# Patient Record
Sex: FEMALE | Race: BLACK OR AFRICAN AMERICAN | Employment: FULL TIME | URBAN - METROPOLITAN AREA
[De-identification: names, ages, dates, MRNs, and addresses within clinical notes are randomized per-mention and may not be internally consistent; named-entity substitution may affect disease eponyms.]

---

## 2019-12-08 ENCOUNTER — HOSPITAL ENCOUNTER (EMERGENCY)
Age: 59
Discharge: HOME | End: 2019-12-08
Attending: EMERGENCY MEDICINE
Payer: COMMERCIAL

## 2019-12-08 ENCOUNTER — APPOINTMENT (OUTPATIENT)
Dept: GENERAL RADIOLOGY | Age: 59
End: 2019-12-08
Attending: EMERGENCY MEDICINE
Payer: COMMERCIAL

## 2019-12-08 VITALS
WEIGHT: 200 LBS | DIASTOLIC BLOOD PRESSURE: 87 MMHG | TEMPERATURE: 98.4 F | HEART RATE: 63 BPM | OXYGEN SATURATION: 100 % | SYSTOLIC BLOOD PRESSURE: 179 MMHG | HEIGHT: 62 IN | RESPIRATION RATE: 20 BRPM | BODY MASS INDEX: 36.8 KG/M2

## 2019-12-08 DIAGNOSIS — M54.32 SCIATICA OF LEFT SIDE: Primary | ICD-10-CM

## 2019-12-08 PROCEDURE — 99282 EMERGENCY DEPT VISIT SF MDM: CPT

## 2019-12-08 PROCEDURE — 73502 X-RAY EXAM HIP UNI 2-3 VIEWS: CPT

## 2019-12-08 RX ORDER — ASPIRIN 81 MG/1
81 TABLET ORAL DAILY
COMMUNITY

## 2019-12-08 RX ORDER — HYDROCHLOROTHIAZIDE 12.5 MG/1
25 TABLET ORAL DAILY
COMMUNITY
End: 2021-10-06 | Stop reason: SDUPTHER

## 2019-12-08 RX ORDER — METHOCARBAMOL 500 MG/1
500 TABLET, FILM COATED ORAL 4 TIMES DAILY
Qty: 16 TAB | Refills: 0 | Status: SHIPPED | OUTPATIENT
Start: 2019-12-08 | End: 2021-07-19

## 2019-12-08 RX ORDER — PREDNISONE 10 MG/1
TABLET ORAL
Qty: 21 TAB | Refills: 0 | Status: SHIPPED | OUTPATIENT
Start: 2019-12-08 | End: 2021-07-19 | Stop reason: ALTCHOICE

## 2019-12-08 RX ORDER — ATORVASTATIN CALCIUM 80 MG/1
80 TABLET, FILM COATED ORAL DAILY
COMMUNITY

## 2019-12-08 RX ORDER — LOSARTAN POTASSIUM 100 MG/1
100 TABLET ORAL DAILY
COMMUNITY

## 2019-12-08 NOTE — LETTER
NOTIFICATION OF RETURN TO WORK / SCHOOL 
 
12/8/2019 Ms. Jaylyn Huang [address] [facility] ([place], [place]) [address] To Whom It May Concern: Jaylyn Huang was seen in the ED on 12/8/19 and may be excused from work for 3 days. Sincerely, Brittney Hamilton PA-C

## 2019-12-08 NOTE — ED TRIAGE NOTES
Pt c/o left hip for \"weeks\". Getting worse. Better when sitting still. Hx of same years ago but it went away. Just moved here from [place].  States it didn't hurt while she was there

## 2019-12-09 NOTE — ED PROVIDER NOTES
EMERGENCY DEPARTMENT HISTORY AND PHYSICAL EXAM    Date: 12/8/2019  Patient Name: Wan Dozier    History of Presenting Illness     Chief Complaint   Patient presents with    Hip Pain         History Provided By:patient     Chief Complaint:hip pain   Duration: several weeks  Timing: acute  Location: L hip and buttocks  Quality: sharp pain  Severity:moderate  Modifying Factors: tylenol has not helped  Associated Symptoms: none       Additional History (Context): Wan Dozier is a 61 y.o. female with PMH GERD, htn, and colon cancer who presents with c/o atraumatic L hip/buttocks pain worse with movement and not alleviated by tylenol x several weeks. Pt denies urinary sx and back pain. No other complaints reported at this time. PCP: Antonia Johnson MD    Current Outpatient Medications   Medication Sig Dispense Refill    atorvastatin (LIPITOR) 40 mg tablet Take 40 mg by mouth daily.  losartan (COZAAR) 50 mg tablet Take 50 mg by mouth daily.  hydroCHLOROthiazide (HYDRODIURIL) 25 mg tablet Take 25 mg by mouth daily.  aspirin delayed-release 81 mg tablet Take 81 mg by mouth daily.  predniSONE (STERAPRED DS) 10 mg dose pack Use as directed 21 Tab 0    methocarbamol (ROBAXIN) 500 mg tablet Take 1 Tab by mouth four (4) times daily. 16 Tab 0    losartan-hydrochlorothiazide (HYZAAR) 50-12.5 mg per tablet Take 1 tablet by mouth daily. Past History     Past Medical History:  Past Medical History:   Diagnosis Date    Cancer [facility])     colon    GERD (gastroesophageal reflux disease)     History of echocardiogram 03/30/2004; 9/2014    EF 60-65%. No WMA.   Normal echo; EF - 50%    Hypertension     Ill-defined condition     high cholesterol    Mixed hyperlipidemia     Osteoarthritis of lumbar spine     Venous (peripheral) insufficiency        Past Surgical History:  Past Surgical History:   Procedure Laterality Date    HX COLONOSCOPY  10/2014    sigmoidoscopy    HX COLONOSCOPY colonoscopy    HX GI      kory anterior resection of colon    HX GYN      hysterectomy    HX HEENT      tonsillectomy       Family History:  Family History   Problem Relation Age of Onset    Hypertension Mother     Lupus Sister     Cancer Maternal Grandmother         ovarian       Social History:  Social History     Tobacco Use    Smoking status: Never Smoker    Smokeless tobacco: Never Used   Substance Use Topics    Alcohol use: Yes     Comment: 3 glass a week     Drug use: No       Allergies: Allergies   Allergen Reactions    Sulfa (Sulfonamide Antibiotics) Itching         Review of Systems   Review of Systems   Constitutional: Negative. Negative for chills and fever. HENT: Negative. Negative for congestion, ear pain and rhinorrhea. Eyes: Negative. Negative for pain and redness. Respiratory: Negative. Negative for cough, shortness of breath, wheezing and stridor. Cardiovascular: Negative. Negative for chest pain and leg swelling. Gastrointestinal: Negative. Negative for abdominal pain, constipation, diarrhea, nausea and vomiting. Genitourinary: Negative. Negative for dysuria and frequency. Musculoskeletal: Positive for arthralgias. Negative for back pain and neck pain. Skin: Negative. Negative for rash and wound. Neurological: Negative. Negative for dizziness, seizures, syncope and headaches. All other systems reviewed and are negative. All Other Systems Negative  Physical Exam     Vitals:    12/08/19 1818   BP: 179/87   Pulse: 63   Resp: 20   Temp: 98.4 °F (36.9 °C)   SpO2: 100%   Weight: 90.7 kg (200 lb)   Height: 5' 2\" (1.575 m)     Physical Exam  Vitals signs and nursing note reviewed. Constitutional:       General: She is not in acute distress. Appearance: She is well-developed. She is obese. She is not diaphoretic. HENT:      Head: Normocephalic and atraumatic. Eyes:      General: No scleral icterus. Right eye: No discharge.          Left eye: No discharge. Conjunctiva/sclera: Conjunctivae normal.   Neck:      Musculoskeletal: Normal range of motion and neck supple. Cardiovascular:      Rate and Rhythm: Normal rate and regular rhythm. Heart sounds: Normal heart sounds. No murmur. No friction rub. No gallop. Pulmonary:      Effort: Pulmonary effort is normal. No respiratory distress. Breath sounds: Normal breath sounds. No stridor. No wheezing or rales. Musculoskeletal: Normal range of motion. Comments: No midline tenderness to palpation noted to the spine. Mild tenderness to palpation and hypertonicity noted to the left iliac crest and left gluteal vianney region. Increased pain into the left lower extremity noted on deep palpation of this area. Range of motion of the spine is intact. Skin:     General: Skin is warm and dry. Findings: No erythema or rash. Neurological:      Mental Status: She is alert and oriented to person, place, and time. Coordination: Coordination normal.      Comments: Gait is steady and patient exhibits no evidence of ataxia. Patient is able to ambulate without difficulty. No focal neurological deficit noted. No facial droop, slurred speech, or evidence of altered mentation noted on exam.      Psychiatric:         Behavior: Behavior normal.         Thought Content: Thought content normal.                Diagnostic Study Results     Labs -   No results found for this or any previous visit (from the past 12 hour(s)). Radiologic Studies -   XR HIP LT W OR WO PELV 2-3 VWS    (Results Pending)   no acute process   CT Results  (Last 48 hours)    None        CXR Results  (Last 48 hours)    None            Medical Decision Making   I am the first provider for this patient. I reviewed the vital signs, available nursing notes, past medical history, past surgical history, family history and social history. Vital Signs-Reviewed the patient's vital signs.           Records Reviewed: April J Bladimir Morelos PA-C   Procedures:  Procedures    Provider Notes (Medical Decision Making): Impression:  Sciatica    X-rays negative for acute process, clinical presentation suggestive of sciatica. Will plan to d/c with robaxin and prednisone with pcp follow-up. Pt agrees with this plan. Brittney Hamilton PA-C     MED RECONCILIATION:  No current facility-administered medications for this encounter. Current Outpatient Medications   Medication Sig    atorvastatin (LIPITOR) 40 mg tablet Take 40 mg by mouth daily.  losartan (COZAAR) 50 mg tablet Take 50 mg by mouth daily.  hydroCHLOROthiazide (HYDRODIURIL) 25 mg tablet Take 25 mg by mouth daily.  aspirin delayed-release 81 mg tablet Take 81 mg by mouth daily.  predniSONE (STERAPRED DS) 10 mg dose pack Use as directed    methocarbamol (ROBAXIN) 500 mg tablet Take 1 Tab by mouth four (4) times daily.  losartan-hydrochlorothiazide (HYZAAR) 50-12.5 mg per tablet Take 1 tablet by mouth daily. Disposition:  D/c    DISCHARGE NOTE:   Patient is stable for discharge at this time. I have discussed all the findings from today's work up with the patient, including lab results and imaging. I have answered all questions. Rx for robaxin and ultram given. Rest and close follow-up with the PCP recommended this week. Return to the ED immediately for any new or worsening symptoms.   Brittney Hamilton PA-C     Follow-up Information     Follow up With Specialties Details Why Contact Info    Berny Umaña MD Internal Medicine Schedule an appointment as soon as possible for a visit in 1 week  [address]  [address]  [phone] [address] [facility] EMERGENCY DEPT Emergency Medicine  As needed, If symptoms worsen [address] [facility]  [phone]          Current Discharge Medication List      START taking these medications    Details   predniSONE (STERAPRED DS) 10 mg dose pack Use as directed  Qty: 21 Tab, Refills: 0 methocarbamol (ROBAXIN) 500 mg tablet Take 1 Tab by mouth four (4) times daily. Qty: 16 Tab, Refills: 0                 Diagnosis     Clinical Impression:   1.  Sciatica of left side

## 2019-12-09 NOTE — DISCHARGE INSTRUCTIONS
[email or web address] Activation    Thank you for requesting access to [email or web address]. Please follow the instructions below to securely access and download your online medical record. [email or web address] allows you to send messages to your doctor, view your test results, renew your prescriptions, schedule appointments, and more. How Do I Sign Up? 1. In your internet browser, go to [email or web address]  2. Click on the First Time User? Click Here link in the Sign In box. You will be redirect to the New Member Sign Up page. 3. Enter your cloud.IQ Access Code exactly as it appears below. You will not need to use this code after youve completed the sign-up process. If you do not sign up before the expiration date, you must request a new code. [email or web address][employer]IQ Access Code: [email or web address]  Expires: 1/10/2020  9:19 PM (This is the date your cloud.IQ access code will )    4. Enter the last four digits of your Social Security Number (xxxx) and Date of Birth (mm/dd/yyyy) as indicated and click Submit. You will be taken to the next sign-up page. 5. Create a cloud.IQ ID. This will be your cloud.IQ login ID and cannot be changed, so think of one that is secure and easy to remember. 6. Create a cloud[employer]IQ password. You can change your password at any time. 7. Enter your Password Reset Question and Answer. This can be used at a later time if you forget your password. 8. Enter your e-mail address. You will receive e-mail notification when new information is available in [address] 9. Click Sign Up. You can now view and download portions of your medical record. 10. Click the Download Summary menu link to download a portable copy of your medical information. Additional Information    If you have questions, please visit the Frequently Asked Questions section of the [email or web address] website at [email or web address] Remember, [email or web address] is NOT to be used for urgent needs. For medical emergencies, dial 911.      Complete all medications as prescribed. Follow-up with primary care doctor in 1 week. Return to the ED immediately for any new or worsening symptoms.

## 2020-01-07 ENCOUNTER — HOSPITAL ENCOUNTER (OUTPATIENT)
Dept: MAMMOGRAPHY | Age: 60
Discharge: HOME | End: 2020-01-07
Attending: FAMILY MEDICINE
Payer: COMMERCIAL

## 2020-01-07 ENCOUNTER — HOSPITAL ENCOUNTER (OUTPATIENT)
Dept: BONE DENSITY | Age: 60
Discharge: HOME | End: 2020-01-07
Attending: FAMILY MEDICINE
Payer: COMMERCIAL

## 2020-01-07 DIAGNOSIS — Z78.0 ASYMPTOMATIC MENOPAUSAL STATE: ICD-10-CM

## 2020-01-07 DIAGNOSIS — Z13.820 SCREENING FOR OSTEOPOROSIS: ICD-10-CM

## 2020-01-07 DIAGNOSIS — Z12.31 VISIT FOR SCREENING MAMMOGRAM: ICD-10-CM

## 2020-01-07 PROCEDURE — 77080 DXA BONE DENSITY AXIAL: CPT

## 2020-01-07 PROCEDURE — 77063 BREAST TOMOSYNTHESIS BI: CPT

## 2020-10-12 ENCOUNTER — TRANSCRIBE ORDER (OUTPATIENT)
Dept: SCHEDULING | Age: 60
End: 2020-10-12

## 2020-10-12 DIAGNOSIS — Z12.31 VISIT FOR SCREENING MAMMOGRAM: Primary | ICD-10-CM

## 2020-10-13 ENCOUNTER — TRANSCRIBE ORDER (OUTPATIENT)
Dept: SCHEDULING | Age: 60
End: 2020-10-13

## 2020-10-13 DIAGNOSIS — Z12.31 VISIT FOR SCREENING MAMMOGRAM: Primary | ICD-10-CM

## 2021-03-29 ENCOUNTER — HOSPITAL ENCOUNTER (EMERGENCY)
Age: 61
Discharge: HOME | End: 2021-03-30
Attending: EMERGENCY MEDICINE
Payer: COMMERCIAL

## 2021-03-29 ENCOUNTER — APPOINTMENT (OUTPATIENT)
Dept: CT IMAGING | Age: 61
End: 2021-03-29
Attending: EMERGENCY MEDICINE
Payer: COMMERCIAL

## 2021-03-29 ENCOUNTER — APPOINTMENT (OUTPATIENT)
Dept: GENERAL RADIOLOGY | Age: 61
End: 2021-03-29
Attending: EMERGENCY MEDICINE
Payer: COMMERCIAL

## 2021-03-29 VITALS
WEIGHT: 190 LBS | OXYGEN SATURATION: 99 % | DIASTOLIC BLOOD PRESSURE: 54 MMHG | TEMPERATURE: 98.1 F | BODY MASS INDEX: 34.96 KG/M2 | RESPIRATION RATE: 28 BRPM | HEART RATE: 70 BPM | HEIGHT: 62 IN | SYSTOLIC BLOOD PRESSURE: 118 MMHG

## 2021-03-29 DIAGNOSIS — R00.2 PALPITATIONS: Primary | ICD-10-CM

## 2021-03-29 DIAGNOSIS — R10.13 EPIGASTRIC BURNING SENSATION: ICD-10-CM

## 2021-03-29 LAB
ANION GAP SERPL CALC-SCNC: 9 MMOL/L (ref 3–18)
ATRIAL RATE: 97 BPM
BASOPHILS # BLD: 0 K/UL (ref 0–0.1)
BASOPHILS NFR BLD: 0 % (ref 0–2)
BNP SERPL-MCNC: 190 PG/ML (ref 0–900)
BUN SERPL-MCNC: 15 MG/DL (ref 7–18)
BUN/CREAT SERPL: 24 (ref 12–20)
CALCIUM SERPL-MCNC: 9.4 MG/DL (ref 8.5–10.1)
CALCULATED P AXIS, ECG09: 55 DEGREES
CALCULATED R AXIS, ECG10: -8 DEGREES
CALCULATED T AXIS, ECG11: -10 DEGREES
CHLORIDE SERPL-SCNC: 104 MMOL/L (ref 100–111)
CK MB CFR SERPL CALC: NORMAL % (ref 0–4)
CK MB CFR SERPL CALC: NORMAL % (ref 0–4)
CK MB SERPL-MCNC: <1 NG/ML (ref 5–25)
CK MB SERPL-MCNC: <1 NG/ML (ref 5–25)
CK SERPL-CCNC: 131 U/L (ref 26–192)
CK SERPL-CCNC: 85 U/L (ref 26–192)
CO2 SERPL-SCNC: 27 MMOL/L (ref 21–32)
CREAT SERPL-MCNC: 0.62 MG/DL (ref 0.6–1.3)
DIAGNOSIS, 93000: NORMAL
DIFFERENTIAL METHOD BLD: ABNORMAL
EOSINOPHIL # BLD: 0.1 K/UL (ref 0–0.4)
EOSINOPHIL NFR BLD: 2 % (ref 0–5)
ERYTHROCYTE [DISTWIDTH] IN BLOOD BY AUTOMATED COUNT: 13.9 % (ref 11.6–14.5)
GLUCOSE SERPL-MCNC: 128 MG/DL (ref 74–99)
HCT VFR BLD AUTO: 35.8 % (ref 35–45)
HGB BLD-MCNC: 12.2 G/DL (ref 12–16)
LYMPHOCYTES # BLD: 3.5 K/UL (ref 0.9–3.6)
LYMPHOCYTES NFR BLD: 43 % (ref 21–52)
MCH RBC QN AUTO: 29.2 PG (ref 24–34)
MCHC RBC AUTO-ENTMCNC: 34.1 G/DL (ref 31–37)
MCV RBC AUTO: 85.6 FL (ref 74–97)
MONOCYTES # BLD: 0.4 K/UL (ref 0.05–1.2)
MONOCYTES NFR BLD: 5 % (ref 3–10)
NEUTS SEG # BLD: 4.2 K/UL (ref 1.8–8)
NEUTS SEG NFR BLD: 50 % (ref 40–73)
P-R INTERVAL, ECG05: 168 MS
PLATELET # BLD AUTO: 388 K/UL (ref 135–420)
PMV BLD AUTO: 9.2 FL (ref 9.2–11.8)
POTASSIUM SERPL-SCNC: 3.3 MMOL/L (ref 3.5–5.5)
Q-T INTERVAL, ECG07: 398 MS
QRS DURATION, ECG06: 102 MS
QTC CALCULATION (BEZET), ECG08: 505 MS
RBC # BLD AUTO: 4.18 M/UL (ref 4.2–5.3)
SODIUM SERPL-SCNC: 140 MMOL/L (ref 136–145)
TROPONIN I SERPL-MCNC: <0.02 NG/ML (ref 0–0.04)
TROPONIN I SERPL-MCNC: <0.02 NG/ML (ref 0–0.04)
VENTRICULAR RATE, ECG03: 97 BPM
WBC # BLD AUTO: 8.2 K/UL (ref 4.6–13.2)

## 2021-03-29 PROCEDURE — 74011250636 HC RX REV CODE- 250/636: Performed by: EMERGENCY MEDICINE

## 2021-03-29 PROCEDURE — 83880 ASSAY OF NATRIURETIC PEPTIDE: CPT

## 2021-03-29 PROCEDURE — 74011000250 HC RX REV CODE- 250: Performed by: EMERGENCY MEDICINE

## 2021-03-29 PROCEDURE — 71275 CT ANGIOGRAPHY CHEST: CPT

## 2021-03-29 PROCEDURE — 85025 COMPLETE CBC W/AUTO DIFF WBC: CPT

## 2021-03-29 PROCEDURE — 82553 CREATINE MB FRACTION: CPT

## 2021-03-29 PROCEDURE — C9113 INJ PANTOPRAZOLE SODIUM, VIA: HCPCS | Performed by: EMERGENCY MEDICINE

## 2021-03-29 PROCEDURE — 74011250637 HC RX REV CODE- 250/637: Performed by: EMERGENCY MEDICINE

## 2021-03-29 PROCEDURE — 71045 X-RAY EXAM CHEST 1 VIEW: CPT

## 2021-03-29 PROCEDURE — 74011000636 HC RX REV CODE- 636: Performed by: EMERGENCY MEDICINE

## 2021-03-29 PROCEDURE — 80048 BASIC METABOLIC PNL TOTAL CA: CPT

## 2021-03-29 PROCEDURE — 93005 ELECTROCARDIOGRAM TRACING: CPT

## 2021-03-29 PROCEDURE — 99285 EMERGENCY DEPT VISIT HI MDM: CPT

## 2021-03-29 PROCEDURE — 96374 THER/PROPH/DIAG INJ IV PUSH: CPT

## 2021-03-29 RX ORDER — POTASSIUM CHLORIDE 20 MEQ/1
40 TABLET, EXTENDED RELEASE ORAL
Status: COMPLETED | OUTPATIENT
Start: 2021-03-29 | End: 2021-03-29

## 2021-03-29 RX ADMIN — PANTOPRAZOLE SODIUM 40 MG: 40 INJECTION, POWDER, FOR SOLUTION INTRAVENOUS at 21:23

## 2021-03-29 RX ADMIN — POTASSIUM CHLORIDE 40 MEQ: 1500 TABLET, EXTENDED RELEASE ORAL at 21:05

## 2021-03-29 RX ADMIN — IOPAMIDOL 100 ML: 755 INJECTION, SOLUTION INTRAVENOUS at 18:33

## 2021-03-29 NOTE — ED PROVIDER NOTES
EMERGENCY DEPARTMENT HISTORY AND PHYSICAL EXAM  This was created with voice recognition software and transcription errors may be present. 6:03 PM  Date: 3/29/2021  Patient Name: Amalia Chamberlain    History of Presenting Illness     Chief Complaint:    History Provided By:     HPI: Amalia Chamberlain is a 61 y.o. female medical history of colon cancer reflux hypertension high cholesterol hyperlipidemia who presents with belching and palpitations. Patient states that she has been having some increased belching and took an over-the-counter antacid for it. She notes that it is only occurring after she eats it did occur this morning but she had not eaten anything she was doing working at home. She notes that she kept having to stand up and walk outside because she was feeling palpitations like she might be short of breath. Denies any abdominal pain or chest pain nausea or vomiting. No diaphoresis. Patient is feeling better now    PCP: Orquidea Raphael DO      Past History     Past Medical History:  Past Medical History:   Diagnosis Date    Cancer [facility])     colon    GERD (gastroesophageal reflux disease)     History of echocardiogram 03/30/2004; 9/2014    EF 60-65%. No WMA.   Normal echo; EF - 50%    Hypertension     Ill-defined condition     high cholesterol    Mixed hyperlipidemia     Osteoarthritis of lumbar spine     Venous (peripheral) insufficiency        Past Surgical History:  Past Surgical History:   Procedure Laterality Date    HX COLONOSCOPY  10/2014    sigmoidoscopy    HX COLONOSCOPY      colonoscopy    HX GI      kory anterior resection of colon    HX GYN      hysterectomy    HX HEENT      tonsillectomy       Family History:  Family History   Problem Relation Age of Onset    Hypertension Mother     Lupus Sister     Cancer Maternal Grandmother         ovarian       Social History:  Social History     Tobacco Use    Smoking status: Never Smoker    Smokeless tobacco: Never Used Substance Use Topics    Alcohol use: Yes     Comment: 3 glass a week     Drug use: No       Allergies: Allergies   Allergen Reactions    Sulfa (Sulfonamide Antibiotics) Itching       Review of Systems     Review of Systems   All other systems reviewed and are negative. 10 point review of systems otherwise negative unless noted in HPI. Physical Exam       Physical Exam  Constitutional:       Appearance: She is well-developed. HENT:      Head: Normocephalic and atraumatic. Eyes:      Pupils: Pupils are equal, round, and reactive to light. Neck:      Musculoskeletal: Normal range of motion and neck supple. Cardiovascular:      Rate and Rhythm: Normal rate and regular rhythm. Heart sounds: Normal heart sounds. No murmur. No friction rub. Pulmonary:      Effort: Pulmonary effort is normal. No respiratory distress. Breath sounds: Normal breath sounds. No wheezing. Abdominal:      General: There is no distension. Palpations: Abdomen is soft. Tenderness: There is no abdominal tenderness. There is no guarding or rebound. Musculoskeletal: Normal range of motion. Skin:     General: Skin is warm and dry. Neurological:      Mental Status: She is alert and oriented to person, place, and time. Psychiatric:         Behavior: Behavior normal.         Thought Content: Thought content normal.         Diagnostic Study Results     Vital Signs   Visit Vitals  BP (!) 145/86   Pulse 82   Temp 98.1 °F (36.7 °C)   Resp 22   Ht 5' 2\" (1.575 m)   Wt 86.2 kg (190 lb)   SpO2 100%   BMI 34.75 kg/m²      EKG: EKG shows sinus at 97 normal axis normal intervals there is no ST elevation or depression lvh  Labs: CBC and chemistry unremarkable mild low potassium BNP normal  Imaging: IMPRESSION     No evidence for pulmonary embolism.     Some minimal subsegmental atelectasis in the lung bases. Lungs are otherwise  clear.     Medical Decision Making     ED Course: Progress Notes, Reevaluation, and Consults:      Provider Notes (Medical Decision Making): 71-year-old female presents with belching some palpitations lightheadedness now feeling better will check basic labs troponin EKG and reassess    Mild hypokalemia. Negative x3. Patient feeling better will discharge for outpatient follow-up               Diagnosis     Clinical Impression: No diagnosis found. Disposition:    Patient's Medications   Start Taking    No medications on file   Continue Taking    ASPIRIN DELAYED-RELEASE 81 MG TABLET    Take 81 mg by mouth daily. ATORVASTATIN (LIPITOR) 40 MG TABLET    Take 40 mg by mouth daily. HYDROCHLOROTHIAZIDE (HYDRODIURIL) 25 MG TABLET    Take 25 mg by mouth daily. LOSARTAN (COZAAR) 50 MG TABLET    Take 50 mg by mouth daily. LOSARTAN-HYDROCHLOROTHIAZIDE (HYZAAR) 50-12.5 MG PER TABLET    Take 1 tablet by mouth daily. METHOCARBAMOL (ROBAXIN) 500 MG TABLET    Take 1 Tab by mouth four (4) times daily.     PREDNISONE (STERAPRED DS) 10 MG DOSE PACK    Use as directed   These Medications have changed    No medications on file   Stop Taking    No medications on file

## 2021-03-29 NOTE — LETTER
NOTIFICATION RETURN TO WORK / SCHOOL 
 
3/29/2021 10:54 PM 
 
Ms. Jake Quiroz [address] [id number] To Whom It May Concern: Jake Quiroz is currently under the care of [facility] EM[facility]. She will return to work/school on: 3/30/2021 If there are questions or concerns please have the patient contact our office. Sincerely, Arnie Infante RN

## 2021-03-29 NOTE — ED TRIAGE NOTES
Per medics: patient has a hx of HTN and anxiety; patient lungs are bilateral and equal; patient does not complain of CP, however she has chest tightness. Patient has jaw tightness. Patient is alert and oriented x 4 and ambulatory.

## 2021-03-29 NOTE — ED NOTES
Pt in bed resting quietly. Patient states \" my chest is still feeling a little funny, but it feels better than it did before. \" Pt requested bed rail be put down so that she can use the bedside commode. Pt also stated she was supposed to be receive a blood pressure pill.  Will talk to MD about pt's request.

## 2021-03-29 NOTE — ED NOTES
Received report from [facility], [address] [facility].

## 2021-03-30 NOTE — ED NOTES
Provided pt with discharge paperwork and work note (as requested by pt). Advised pt to come back if pain persists. Pt stated she had a doctors appt tomorrow. Encouraged pt to keep appt. Pt inquired about a stress test, but no prescription or referral written about stress test. Patient had no further questions or concerns.

## 2021-06-25 ENCOUNTER — HOSPITAL ENCOUNTER (OUTPATIENT)
Dept: GENERAL RADIOLOGY | Age: 61
Discharge: HOME | End: 2021-06-25
Payer: COMMERCIAL

## 2021-06-25 ENCOUNTER — TRANSCRIBE ORDER (OUTPATIENT)
Dept: REGISTRATION | Age: 61
End: 2021-06-25

## 2021-06-25 DIAGNOSIS — M79.672 LEFT FOOT PAIN: Primary | ICD-10-CM

## 2021-06-25 DIAGNOSIS — M79.672 LEFT FOOT PAIN: ICD-10-CM

## 2021-06-25 PROCEDURE — 73620 X-RAY EXAM OF FOOT: CPT

## 2021-07-19 ENCOUNTER — OFFICE VISIT (OUTPATIENT)
Dept: CARDIOLOGY CLINIC | Age: 61
End: 2021-07-19
Payer: COMMERCIAL

## 2021-07-19 VITALS
DIASTOLIC BLOOD PRESSURE: 94 MMHG | HEART RATE: 61 BPM | BODY MASS INDEX: 36.03 KG/M2 | WEIGHT: 195.8 LBS | SYSTOLIC BLOOD PRESSURE: 153 MMHG | OXYGEN SATURATION: 96 % | HEIGHT: 62 IN

## 2021-07-19 DIAGNOSIS — R94.31 ABNORMAL EKG: ICD-10-CM

## 2021-07-19 DIAGNOSIS — I10 ESSENTIAL HYPERTENSION: ICD-10-CM

## 2021-07-19 DIAGNOSIS — E78.5 DYSLIPIDEMIA: ICD-10-CM

## 2021-07-19 DIAGNOSIS — I51.7 LEFT ATRIAL ENLARGEMENT: ICD-10-CM

## 2021-07-19 DIAGNOSIS — R00.2 PALPITATIONS: Primary | ICD-10-CM

## 2021-07-19 PROCEDURE — 93000 ELECTROCARDIOGRAM COMPLETE: CPT | Performed by: INTERNAL MEDICINE

## 2021-07-19 PROCEDURE — 99204 OFFICE O/P NEW MOD 45 MIN: CPT | Performed by: INTERNAL MEDICINE

## 2021-07-19 RX ORDER — OMEPRAZOLE 40 MG/1
40 CAPSULE, DELAYED RELEASE ORAL DAILY
COMMUNITY
End: 2021-10-06 | Stop reason: ALTCHOICE

## 2021-07-19 NOTE — PROGRESS NOTES
HISTORY OF PRESENT ILLNESS  Michael Leonardo is a 61 y.o. female. 7/19/2021  Patient is here today for new patient evaluation. She is referred here for evaluation of palpitation. Patient has history of hypertension and hyperlipidemia. In past she was evaluated for shortness of breath. Patient has occasional episode of irregular heartbeat. This happens every few months. They are short lasting and self terminating. Denies any shortness of breath at present. Denies chest pain tightness pressure. About 3 years ago she had work-up in [place] that was reportedly negative. Review of Systems   Constitutional: Negative for chills and fever. HENT: Negative for nosebleeds. Eyes: Negative for blurred vision and double vision. Respiratory: Negative for cough, hemoptysis, sputum production, shortness of breath and wheezing. Cardiovascular: Positive for palpitations. Negative for chest pain, orthopnea, claudication, leg swelling and PND. Gastrointestinal: Negative for abdominal pain, heartburn, nausea and vomiting. Musculoskeletal: Negative for myalgias. Skin: Negative for rash. Neurological: Negative for dizziness, weakness and headaches. Endo/Heme/Allergies: Does not bruise/bleed easily. Family History   Problem Relation Age of Onset    Hypertension Mother     Lupus Sister     Cancer Maternal Grandmother         ovarian       Past Medical History:   Diagnosis Date    Cancer [facility])     colon    GERD (gastroesophageal reflux disease)     History of echocardiogram 03/30/2004; 9/2014    EF 60-65%. No WMA.   Normal echo; EF - 50%    Hypertension     Ill-defined condition     high cholesterol    Mixed hyperlipidemia     Osteoarthritis of lumbar spine     Venous (peripheral) insufficiency        Past Surgical History:   Procedure Laterality Date    HX COLONOSCOPY  10/2014    sigmoidoscopy    HX COLONOSCOPY      colonoscopy    HX GI      kory anterior resection of colon    HX GYN hysterectomy    HX HEENT      tonsillectomy       Social History     Tobacco Use    Smoking status: Never Smoker    Smokeless tobacco: Never Used   Substance Use Topics    Alcohol use: Yes     Comment: 3 glass a week        Allergies   Allergen Reactions    Sulfa (Sulfonamide Antibiotics) Itching       Prior to Admission medications    Medication Sig Start Date End Date Taking? Authorizing Provider   omeprazole (PRILOSEC) 40 mg capsule Take 40 mg by mouth daily. Yes Provider, Lucie   atorvastatin (LIPITOR) 80 mg tablet Take 80 mg by mouth daily. Yes Other, MD Marivel   losartan (COZAAR) 100 mg tablet Take 100 mg by mouth daily. Yes Other, MD Marivel   hydroCHLOROthiazide (HYDRODIURIL) 12.5 mg tablet Take 25 mg by mouth daily. Yes Neda, MD Marivel   aspirin delayed-release 81 mg tablet Take 81 mg by mouth daily. Yes Other, MD Marivel         Visit Vitals  BP (!) 153/94 (BP 1 Location: Left upper arm, BP Patient Position: Sitting, BP Cuff Size: Large adult)   Pulse 61   Ht 5' 2\" (1.575 m)   Wt 88.8 kg (195 lb 12.8 oz)   SpO2 96%   BMI 35.81 kg/m²         Physical Exam  Constitutional:       Appearance: She is well-developed. HENT:      Head: Normocephalic and atraumatic. Eyes:      Conjunctiva/sclera: Conjunctivae normal.   Neck:      Thyroid: No thyromegaly. Vascular: No JVD. Trachea: No tracheal deviation. Cardiovascular:      Rate and Rhythm: Normal rate and regular rhythm. Chest Wall: PMI is not displaced. Pulses: No decreased pulses. Heart sounds: No murmur heard. No gallop. No S3 sounds. Pulmonary:      Effort: No respiratory distress. Breath sounds: No wheezing or rales. Chest:      Chest wall: No tenderness. Abdominal:      Palpations: Abdomen is soft. Tenderness: There is no abdominal tenderness. Musculoskeletal:      Cervical back: Neck supple. Skin:     General: Skin is warm.    Neurological:      Mental Status: She is alert and oriented to person, place, and time. Ms. Jose Way has a reminder for a \"due or due soon\" health maintenance. I have asked that she contact her primary care provider for follow-up on this health maintenance. No flowsheet data found. I have personally reviewed patient's records available from hospital and other providers and incorporated findings in patient care. Provider notes, lab, EKG, CT chest, echo      SUMMARY: Echocardiogram exam2014  Left ventricle: Size was normal. Systolic function was normal by EF   (biplane method of disks). Ejection fraction was estimated to be 50 %. No   obvious wall motion abnormalities identified in the views obtained. Wall   thickness was at the upper limits of normal. Doppler parameters were   consistent with abnormal left ventricular relaxation (grade 1 diastolic   dysfunction). Right ventricle: Systolic pressure was at the upper limits of normal.   Estimated peak pressure was 30 mmHg. Left atrium: The atrium was mildly to moderately dilated. IMPRESSION CTA chest3/2021     No evidence for pulmonary embolism.     Some minimal subsegmental atelectasis in the lung bases. Lungs are otherwise  Clear. Diagnosis  3/2021  Final   Normal sinus rhythm   Possible Left atrial enlargement   Left ventricular hypertrophy   Nonspecific ST and T wave abnormality   Prolonged QT   Abnormal ECG        Assessment         ICD-10-CM ICD-9-CM    1. Palpitations  R00.2 785.1 AMB POC EKG ROUTINE W/ 12 LEADS, INTER & REP      ECHO ADULT COMPLETE    Symptoms are rare. We will continue to monitor clinically at present. 2. Essential hypertension  I10 401.9     Mildly elevated blood pressure but just finished exercising usually runs normal monitor and decide on adjustment of medication   3. Dyslipidemia  E78.5 272.4     Continue treatment lab with PCP   4. Abnormal EKG  R94.31 794.31 ECHO ADULT COMPLETE    Nonspecific changes possible hypertensive disease. Enlarged left atrium in past   5. Left atrial enlargement  I51.7 429.3 ECHO ADULT COMPLETE    Noted on past echocardiogram exam.  Mild to moderate. Follow-up     7/2021  Seen for new patient evaluation. Abnormal EKG. History of left atrial enlargement hypertension and hyperlipidemia. Palpitations which are rare. We will continue to monitor clinically check echo for left atrial enlargement and LV function. Medications Discontinued During This Encounter   Medication Reason    predniSONE (STERAPRED DS) 10 mg dose pack Therapy Completed    methocarbamol (ROBAXIN) 500 mg tablet Not A Current Medication    losartan-hydrochlorothiazide (HYZAAR) 50-12.5 mg per tablet Not A Current Medication       Orders Placed This Encounter    AMB POC EKG ROUTINE W/ 12 LEADS, INTER & REP     Order Specific Question:   Reason for Exam:     Answer:   Palpitations    ECHO ADULT COMPLETE     Standing Status:   Future     Standing Expiration Date:   7/19/2022     Order Specific Question:   Contrast Enhancement (Bubble Study, Definity, Optison) may be used if criteria listed in established evidence-based protocol has been identified. Answer:   Yes       Follow-up and Dispositions    · Return for F/u after tests, Follow-up with Edilia.

## 2021-07-28 ENCOUNTER — TRANSCRIBE ORDER (OUTPATIENT)
Dept: SCHEDULING | Age: 61
End: 2021-07-28

## 2021-07-28 DIAGNOSIS — Z12.31 SCREENING MAMMOGRAM FOR HIGH-RISK PATIENT: Primary | ICD-10-CM

## 2021-08-12 ENCOUNTER — HOSPITAL ENCOUNTER (OUTPATIENT)
Dept: MAMMOGRAPHY | Age: 61
Discharge: HOME | End: 2021-08-12
Attending: FAMILY MEDICINE
Payer: COMMERCIAL

## 2021-08-12 DIAGNOSIS — Z12.31 SCREENING MAMMOGRAM FOR HIGH-RISK PATIENT: ICD-10-CM

## 2021-08-12 PROCEDURE — 77063 BREAST TOMOSYNTHESIS BI: CPT

## 2021-10-06 ENCOUNTER — OFFICE VISIT (OUTPATIENT)
Dept: CARDIOLOGY CLINIC | Age: 61
End: 2021-10-06
Payer: COMMERCIAL

## 2021-10-06 VITALS
HEART RATE: 62 BPM | OXYGEN SATURATION: 99 % | HEIGHT: 62 IN | SYSTOLIC BLOOD PRESSURE: 162 MMHG | WEIGHT: 192 LBS | DIASTOLIC BLOOD PRESSURE: 80 MMHG | BODY MASS INDEX: 35.33 KG/M2

## 2021-10-06 DIAGNOSIS — E78.5 DYSLIPIDEMIA: ICD-10-CM

## 2021-10-06 DIAGNOSIS — I10 ESSENTIAL HYPERTENSION: ICD-10-CM

## 2021-10-06 DIAGNOSIS — I50.32 CHRONIC DIASTOLIC HEART FAILURE (HCC): Primary | ICD-10-CM

## 2021-10-06 DIAGNOSIS — I51.7 LEFT ATRIAL ENLARGEMENT: ICD-10-CM

## 2021-10-06 DIAGNOSIS — R00.2 PALPITATIONS: ICD-10-CM

## 2021-10-06 PROCEDURE — 99214 OFFICE O/P EST MOD 30 MIN: CPT | Performed by: NURSE PRACTITIONER

## 2021-10-06 RX ORDER — PANTOPRAZOLE SODIUM 40 MG/1
40 TABLET, DELAYED RELEASE ORAL DAILY
COMMUNITY
Start: 2021-09-08

## 2021-10-06 RX ORDER — HYDROCHLOROTHIAZIDE 25 MG/1
25 TABLET ORAL DAILY
COMMUNITY
Start: 2021-09-08

## 2021-10-06 RX ORDER — ERGOCALCIFEROL 1.25 MG/1
CAPSULE ORAL
COMMUNITY
Start: 2021-09-01

## 2021-10-06 NOTE — PATIENT INSTRUCTIONS
After the recommended changes have been made in blood pressure medicines, patient advised to keep BP/HR(pulse rate) chart twice daily and bring us results in next 4 to 5 days. Patient may send the results via \"My Chart\" if desired. Please rest for 5-10 minutes before checking blood pressure. Sit on a comfortable chair without crossing the legs and put your arm on a table. We recommend that you use an upper arm cuff. Check the blood pressure 3 times each time you check the blood pressure and record the lowest reading. If you check the blood pressure in both arms, use the higher reading. Heart-Healthy Diet: Care Instructions  Your Care Instructions     A heart-healthy diet has lots of vegetables, fruits, nuts, beans, and whole grains, and is low in salt. It limits foods that are high in saturated fat, such as meats, cheeses, and fried foods. It may be hard to change your diet, but even small changes can lower your risk of heart attack and heart disease. Follow-up care is a key part of your treatment and safety. Be sure to make and go to all appointments, and call your doctor if you are having problems. It's also a good idea to know your test results and keep a list of the medicines you take. How can you care for yourself at home? Watch your portions  · Use food labels to learn what the recommended servings are for the foods you eat. · Eat only the number of calories you need to stay at a healthy weight. If you need to lose weight, eat fewer calories than your body burns (through exercise and other physical activity). Eat more fruits and vegetables  · Eat a variety of fruit and vegetables every day. Dark green, deep orange, red, or yellow fruits and vegetables are especially good for you. Examples include spinach, carrots, peaches, and berries. · Keep carrots, celery, and other veggies handy for snacks.  Buy fruit that is in season and store it where you can see it so that you will be tempted to eat it.  · Cook dishes that have a lot of veggies in them, such as stir-fries and soups. Limit saturated fat  · Read food labels, and try to avoid saturated fats. They increase your risk of heart disease. · Use olive or canola oil when you cook. · Bake, broil, grill, or steam foods instead of frying them. · Choose lean meats instead of high-fat meats such as hot dogs and sausages. Cut off all visible fat when you prepare meat. · Eat fish, skinless poultry, and meat alternatives such as soy products instead of high-fat meats. Soy products, such as tofu, may be especially good for your heart. · Choose low-fat or fat-free milk and dairy products. Eat foods high in fiber  · Eat a variety of grain products every day. Include whole-grain foods that have lots of fiber and nutrients. Examples of whole-grain foods include oats, whole wheat bread, and brown rice. · Buy whole-grain breads and cereals, instead of white bread or pastries. Limit salt and sodium  · Limit how much salt and sodium you eat to help lower your blood pressure. · Taste food before you salt it. Add only a little salt when you think you need it. With time, your taste buds will adjust to less salt. · Eat fewer snack items, fast foods, and other high-salt, processed foods. Check food labels for the amount of sodium in packaged foods. · Choose low-sodium versions of canned goods (such as soups, vegetables, and beans). Limit sugar  · Limit drinks and foods with added sugar. These include candy, desserts, and soda pop. Limit alcohol  · Limit alcohol to no more than 2 drinks a day for men and 1 drink a day for women. Too much alcohol can cause health problems. When should you call for help? Watch closely for changes in your health, and be sure to contact your doctor if:    · You would like help planning heart-healthy meals. Where can you learn more?   Go to http://www.gray.com/  Enter V137 in the search box to learn more about \"Heart-Healthy Diet: Care Instructions. \"  Current as of: December 17, 2020               Content Version: 13.0  © 2006-2021 [employer]. Care instructions adapted under license by [employer] (which disclaims liability or warranty for this information). If you have questions about a medical condition or this instruction, always ask your healthcare professional. [address] any warranty or liability for your use of this information.

## 2021-10-06 NOTE — PROGRESS NOTES
HISTORY OF PRESENT ILLNESS  Yin Molina is a 61 y.o. female. 7/19/2021  Patient is here today for new patient evaluation. She is referred here for evaluation of palpitation. Patient has history of hypertension and hyperlipidemia. In past she was evaluated for shortness of breath. Patient has occasional episode of irregular heartbeat. This happens every few months. They are short lasting and self terminating. Denies any shortness of breath at present. Denies chest pain tightness pressure. About 3 years ago she had work-up in [place] that was reportedly negative. 10/2021  Patient presents to follow-up for palpitations and echocardiogram results. She reports occasional palpitations. She denies shortness of breath, chest pain, or BLE edema. Review of Systems   Constitutional: Negative for chills and fever. HENT: Negative for nosebleeds. Eyes: Negative for blurred vision and double vision. Respiratory: Negative for cough, hemoptysis, sputum production, shortness of breath and wheezing. Cardiovascular: Positive for palpitations. Negative for chest pain, orthopnea, claudication, leg swelling and PND. Gastrointestinal: Negative for abdominal pain, heartburn, nausea and vomiting. Musculoskeletal: Negative for myalgias. Skin: Negative for rash. Neurological: Negative for dizziness, weakness and headaches. Endo/Heme/Allergies: Does not bruise/bleed easily. Family History   Problem Relation Age of Onset    Hypertension Mother     Lupus Sister     Cancer Maternal Grandmother         ovarian       Past Medical History:   Diagnosis Date    Cancer [facility])     colon    GERD (gastroesophageal reflux disease)     History of echocardiogram 03/30/2004; 9/2014    EF 60-65%. No WMA.   Normal echo; EF - 50%    Hypertension     Ill-defined condition     high cholesterol    Mixed hyperlipidemia     Osteoarthritis of lumbar spine     Venous (peripheral) insufficiency        Past Surgical History:   Procedure Laterality Date    HX COLONOSCOPY  10/2014    sigmoidoscopy    HX COLONOSCOPY      colonoscopy    HX GI      kory anterior resection of colon    HX GYN      hysterectomy    HX HEENT      tonsillectomy       Social History     Tobacco Use    Smoking status: Never Smoker    Smokeless tobacco: Never Used   Substance Use Topics    Alcohol use: Yes     Comment: 3 glass a week        Allergies   Allergen Reactions    Sulfa (Sulfonamide Antibiotics) Itching       Prior to Admission medications    Medication Sig Start Date End Date Taking? Authorizing Provider   pantoprazole (PROTONIX) 40 mg tablet Take 40 mg by mouth daily. 9/8/21  Yes Provider, Historical   ergocalciferol (ERGOCALCIFEROL) 1,250 mcg (50,000 unit) capsule TAKE 1 CAPSULE BY MOUTH ONE TIME PER WEEK 9/1/21  Yes Provider, Historical   hydroCHLOROthiazide (HYDRODIURIL) 25 mg tablet Take 25 mg by mouth daily. 9/8/21  Yes Provider, Historical   atorvastatin (LIPITOR) 80 mg tablet Take 80 mg by mouth daily. Yes Other, MD Marivel   losartan (COZAAR) 100 mg tablet Take 100 mg by mouth daily. Yes Other, MD Marivel   aspirin delayed-release 81 mg tablet Take 81 mg by mouth daily. Yes Other, MD Marivel   omeprazole (PRILOSEC) 40 mg capsule Take 40 mg by mouth daily. Patient not taking: Reported on 10/6/2021  10/6/21  Provider, Historical   hydroCHLOROthiazide (HYDRODIURIL) 12.5 mg tablet Take 25 mg by mouth daily. Patient not taking: Reported on 10/6/2021  10/6/21  Neda, MD Marivel         Visit Vitals  BP (!) 162/80   Pulse 62   Ht 5' 2\" (1.575 m)   Wt 87.1 kg (192 lb)   SpO2 99%   BMI 35.12 kg/m²         Physical Exam  Constitutional:       Appearance: She is well-developed. HENT:      Head: Normocephalic and atraumatic. Eyes:      Conjunctiva/sclera: Conjunctivae normal.   Neck:      Thyroid: No thyromegaly. Vascular: No JVD. Trachea: No tracheal deviation.    Cardiovascular:      Rate and Rhythm: Normal rate and regular rhythm. Chest Wall: PMI is not displaced. Pulses: No decreased pulses. Heart sounds: No murmur heard. No gallop. No S3 sounds. Pulmonary:      Effort: No respiratory distress. Breath sounds: No wheezing or rales. Chest:      Chest wall: No tenderness. Abdominal:      Palpations: Abdomen is soft. Tenderness: There is no abdominal tenderness. Musculoskeletal:      Cervical back: Neck supple. Skin:     General: Skin is warm. Neurological:      Mental Status: She is alert and oriented to person, place, and time. Ms. Callum Saez has a reminder for a \"due or due soon\" health maintenance. I have asked that she contact her primary care provider for follow-up on this health maintenance. No flowsheet data found. I have personally reviewed patient's records available from hospital and other providers and incorporated findings in patient care. Provider notes, lab, EKG, CT chest, echo      SUMMARY: Echocardiogram exam-2014  Left ventricle: Size was normal. Systolic function was normal by EF   (biplane method of disks). Ejection fraction was estimated to be 50 %. No   obvious wall motion abnormalities identified in the views obtained. Wall   thickness was at the upper limits of normal. Doppler parameters were   consistent with abnormal left ventricular relaxation (grade 1 diastolic   dysfunction). Right ventricle: Systolic pressure was at the upper limits of normal.   Estimated peak pressure was 30 mmHg. Left atrium: The atrium was mildly to moderately dilated. IMPRESSION CTA chest-3/2021     No evidence for pulmonary embolism.     Some minimal subsegmental atelectasis in the lung bases. Lungs are otherwise  Clear.     Diagnosis  3/2021  Final   Normal sinus rhythm   Possible Left atrial enlargement   Left ventricular hypertrophy   Nonspecific ST and T wave abnormality   Prolonged QT   Abnormal ECG      9/29/2021 Echo  Interpretation Summary    · LV: Estimated LVEF is 55 - 60%. Normal cavity size, wall thickness and systolic function (ejection fraction normal). Wall motion: normal. Mild (grade 1) left ventricular diastolic dysfunction. · LA: Mildly dilated left atrium. Left Atrium volume index is 36.23 mL/m2. · MV: Mild mitral valve regurgitation is present. · TV: Mild tricuspid valve regurgitation is present. Right Ventricular Arterial Pressure (RVSP) is 27 mmHg. Pulmonary hypertension not suggested by Doppler findings. · PV: Mild pulmonic valve regurgitation is present. Assessment         ICD-10-CM ICD-9-CM    1. Chronic diastolic heart failure (HCC)  I50.32 428.32     Clinically compensated, monitor   2. Palpitations  R00.2 785.1     Symptoms are rare. We will continue to monitor clinically at present. 3. Essential hypertension  I10 401.9     controlled, monitor   4. Dyslipidemia  E78.5 272.4     Continue treatment lab with PCP   5. Left atrial enlargement  I51.7 429.3     MIld on recent echo - continue to monitor     7/2021  Seen for new patient evaluation. Abnormal EKG. History of left atrial enlargement hypertension and hyperlipidemia. Palpitations which are rare. We will continue to monitor clinically check echo for left atrial enlargement and LV function. 10/2021  Seen in follow-up for palpitations. She reports these continue to be rare. She complains of chronic GERD for which she is now taking Protonix advised to follow-up with GI. Echocardiogram reviewed and discussed with patient normal LV function mild left atrial enlargement noted. Advised to continue CPAP use for sleep apnea she reports uses occasionally only. Blood pressure is mildly elevated in office today. Advised home BP chart send to office in 1 week and we will further adjust meds patient states indicated.     Medications Discontinued During This Encounter   Medication Reason    hydroCHLOROthiazide (HYDRODIURIL) 90.5 mg tablet DUPLICATE ORDER    omeprazole (PRILOSEC) 40 mg capsule Therapy Completed       No orders of the defined types were placed in this encounter. Follow-up and Dispositions    · Return in about 6 months (around 4/6/2022) for Follow up with Dr. Petrona Newsome.

## 2021-10-06 NOTE — PROGRESS NOTES
1. Have you been to the ER, urgent care clinic since your last visit? Hospitalized since your last visit? No    2. Have you seen or consulted any other health care providers outside of the [address] [place] [address] since your last visit? Include any pap smears or colon screening.  No

## 2021-12-06 ENCOUNTER — TRANSCRIBE ORDER (OUTPATIENT)
Dept: SCHEDULING | Age: 61
End: 2021-12-06

## 2021-12-06 DIAGNOSIS — R92.8 ABNORMAL MAMMOGRAM: Primary | ICD-10-CM

## 2022-01-19 ENCOUNTER — TRANSCRIBE ORDER (OUTPATIENT)
Dept: SCHEDULING | Age: 62
End: 2022-01-19

## 2022-01-19 DIAGNOSIS — R92.8 ABNORMAL MAMMOGRAM: Primary | ICD-10-CM

## 2022-03-16 ENCOUNTER — TRANSCRIBE ORDER (OUTPATIENT)
Dept: SCHEDULING | Age: 62
End: 2022-03-16

## 2022-03-16 DIAGNOSIS — M85.80 OSTEOPENIA: Primary | ICD-10-CM

## 2022-12-01 ENCOUNTER — TRANSCRIBE ORDER (OUTPATIENT)
Dept: SCHEDULING | Age: 62
End: 2022-12-01

## 2022-12-01 DIAGNOSIS — Z12.39 BREAST CANCER SCREENING, HIGH RISK PATIENT: ICD-10-CM

## 2022-12-01 DIAGNOSIS — R92.8 ABNORMAL MAMMOGRAM: Primary | ICD-10-CM

## 2023-01-05 ENCOUNTER — HOSPITAL ENCOUNTER (OUTPATIENT)
Dept: ULTRASOUND IMAGING | Age: 63
End: 2023-01-05
Attending: PHYSICIAN ASSISTANT
Payer: COMMERCIAL

## 2023-01-05 ENCOUNTER — HOSPITAL ENCOUNTER (OUTPATIENT)
Dept: MAMMOGRAPHY | Age: 63
Discharge: HOME | End: 2023-01-05
Attending: PHYSICIAN ASSISTANT
Payer: COMMERCIAL

## 2023-01-05 DIAGNOSIS — R92.8 ABNORMAL MAMMOGRAM: ICD-10-CM

## 2023-01-05 PROCEDURE — 77062 BREAST TOMOSYNTHESIS BI: CPT

## 2023-08-30 ENCOUNTER — HOSPITAL ENCOUNTER (OUTPATIENT)
Facility: HOSPITAL | Age: 63
Discharge: HOME | End: 2023-09-02
Payer: COMMERCIAL

## 2023-08-30 ENCOUNTER — TRANSCRIBE ORDERS (OUTPATIENT)
Facility: HOSPITAL | Age: 63
End: 2023-08-30

## 2023-08-30 DIAGNOSIS — M79.641 RIGHT HAND PAIN: ICD-10-CM

## 2023-08-30 DIAGNOSIS — M25.511 RIGHT SHOULDER PAIN, UNSPECIFIED CHRONICITY: Primary | ICD-10-CM

## 2023-08-30 DIAGNOSIS — M25.511 RIGHT SHOULDER PAIN, UNSPECIFIED CHRONICITY: ICD-10-CM

## 2023-08-30 PROCEDURE — 73130 X-RAY EXAM OF HAND: CPT

## 2023-08-30 PROCEDURE — 73030 X-RAY EXAM OF SHOULDER: CPT

## 2024-12-26 ENCOUNTER — HOSPITAL ENCOUNTER (OUTPATIENT)
Facility: HOSPITAL | Age: 64
Discharge: HOME | End: 2024-12-29
Payer: COMMERCIAL

## 2024-12-26 ENCOUNTER — TRANSCRIBE ORDERS (OUTPATIENT)
Facility: HOSPITAL | Age: 64
End: 2024-12-26

## 2024-12-26 DIAGNOSIS — M25.511 RIGHT SHOULDER PAIN, UNSPECIFIED CHRONICITY: ICD-10-CM

## 2024-12-26 DIAGNOSIS — M25.511 RIGHT SHOULDER PAIN, UNSPECIFIED CHRONICITY: Primary | ICD-10-CM

## 2024-12-26 PROCEDURE — 73030 X-RAY EXAM OF SHOULDER: CPT

## 2025-01-23 ENCOUNTER — TRANSCRIBE ORDERS (OUTPATIENT)
Facility: HOSPITAL | Age: 65
End: 2025-01-23

## 2025-01-23 DIAGNOSIS — Z12.31 OTHER SCREENING MAMMOGRAM: Primary | ICD-10-CM

## 2025-03-13 ENCOUNTER — HOSPITAL ENCOUNTER (OUTPATIENT)
Facility: HOSPITAL | Age: 65
Discharge: HOME | End: 2025-03-16
Payer: COMMERCIAL

## 2025-03-13 DIAGNOSIS — M19.011 OSTEOARTHRITIS OF RIGHT SHOULDER, UNSPECIFIED OSTEOARTHRITIS TYPE: ICD-10-CM

## 2025-03-13 PROCEDURE — 73221 MRI JOINT UPR EXTREM W/O DYE: CPT

## 2025-03-31 ENCOUNTER — TRANSCRIBE ORDERS (OUTPATIENT)
Facility: HOSPITAL | Age: 65
End: 2025-03-31

## 2025-03-31 DIAGNOSIS — Z13.820 SCREENING FOR OSTEOPOROSIS: Primary | ICD-10-CM

## 2025-03-31 DIAGNOSIS — Z78.0 POST-MENOPAUSAL: ICD-10-CM

## 2025-04-15 ENCOUNTER — OFFICE VISIT (OUTPATIENT)
Age: 65
End: 2025-04-15
Payer: COMMERCIAL

## 2025-04-15 DIAGNOSIS — M75.101 TEAR OF RIGHT ROTATOR CUFF, UNSPECIFIED TEAR EXTENT, UNSPECIFIED WHETHER TRAUMATIC: Primary | ICD-10-CM

## 2025-04-15 PROCEDURE — 99203 OFFICE O/P NEW LOW 30 MIN: CPT | Performed by: ORTHOPAEDIC SURGERY

## 2025-04-15 PROCEDURE — 20611 DRAIN/INJ JOINT/BURSA W/US: CPT | Performed by: ORTHOPAEDIC SURGERY

## 2025-04-15 RX ORDER — TRIAMCINOLONE ACETONIDE 40 MG/ML
40 INJECTION, SUSPENSION INTRA-ARTICULAR; INTRAMUSCULAR ONCE
Status: COMPLETED | OUTPATIENT
Start: 2025-04-15 | End: 2025-04-15

## 2025-04-15 RX ADMIN — TRIAMCINOLONE ACETONIDE 40 MG: 40 INJECTION, SUSPENSION INTRA-ARTICULAR; INTRAMUSCULAR at 16:09

## 2025-04-15 NOTE — PROGRESS NOTES
Brenda Mccracken  1960   Chief Complaint   Patient presents with    Shoulder Pain     Right shoulder pain          HISTORY OF PRESENT ILLNESS  Brenda Mccracken is a 64 y.o. female who presents today for evaluation of right shoulder pain.  Pain is a 7/10. Pain has been present for 6 months. Denies any recent injury or fall. Pain increases with movement such as overhead and reaching. Notes a sharp pain. Having pain at night time. She uses a heating pad at night time for pain relief. Has tried taking muscle relaxers without significant relief.     Has tried following treatments: Injections:No; Brace:No; Therapy:No; Cane/Crutch:No      Allergies   Allergen Reactions    Sulfa Antibiotics Itching        Past Medical History:   Diagnosis Date    Cancer (HCC)     colon    GERD (gastroesophageal reflux disease)     History of echocardiogram 03/30/2004; 9/2014    EF 60-65%.  No WMA.  Normal echo; EF - 50%    Hypertension     Ill-defined condition     high cholesterol    Mixed hyperlipidemia     Osteoarthritis of lumbar spine     Venous (peripheral) insufficiency       Social History       Tobacco History       Smoking Status  Never      Smokeless Tobacco Use  Never              Alcohol History       Alcohol Use Status  Yes              Drug Use       Drug Use Status  No              Sexual Activity       Sexually Active  Not Asked                   Past Surgical History:   Procedure Laterality Date    COLONOSCOPY      colonoscopy    COLONOSCOPY  10/2014    sigmoidoscopy    GI      kelly anterior resection of colon    GYN      hysterectomy    HEENT      tonsillectomy      Family History   Problem Relation Age of Onset    Cancer Maternal Grandmother         ovarian    Lupus Sister     Hypertension Mother      Current Outpatient Medications   Medication Sig    aspirin 81 MG EC tablet Take 81 mg by mouth daily    atorvastatin (LIPITOR) 80 MG tablet Take 80 mg by mouth daily    ergocalciferol (ERGOCALCIFEROL) 1.25 MG

## 2025-05-30 ENCOUNTER — HOSPITAL ENCOUNTER (OUTPATIENT)
Facility: HOSPITAL | Age: 65
Discharge: HOME | End: 2025-05-30
Payer: COMMERCIAL

## 2025-05-30 VITALS — WEIGHT: 190 LBS | BODY MASS INDEX: 35.87 KG/M2 | HEIGHT: 61 IN

## 2025-05-30 DIAGNOSIS — Z12.31 OTHER SCREENING MAMMOGRAM: ICD-10-CM

## 2025-05-30 PROCEDURE — 77063 BREAST TOMOSYNTHESIS BI: CPT
